# Patient Record
Sex: MALE | NOT HISPANIC OR LATINO | URBAN - METROPOLITAN AREA
[De-identification: names, ages, dates, MRNs, and addresses within clinical notes are randomized per-mention and may not be internally consistent; named-entity substitution may affect disease eponyms.]

---

## 2023-10-07 ENCOUNTER — APPOINTMENT (OUTPATIENT)
Dept: RADIOLOGY | Facility: HOSPITAL | Age: 63
End: 2023-10-07
Payer: COMMERCIAL

## 2023-10-07 ENCOUNTER — HOSPITAL ENCOUNTER (EMERGENCY)
Facility: HOSPITAL | Age: 63
Discharge: HOME | End: 2023-10-07
Attending: EMERGENCY MEDICINE
Payer: COMMERCIAL

## 2023-10-07 VITALS
SYSTOLIC BLOOD PRESSURE: 146 MMHG | OXYGEN SATURATION: 96 % | DIASTOLIC BLOOD PRESSURE: 94 MMHG | HEIGHT: 71 IN | TEMPERATURE: 97.9 F | RESPIRATION RATE: 22 BRPM | WEIGHT: 209.44 LBS | BODY MASS INDEX: 29.32 KG/M2 | HEART RATE: 80 BPM

## 2023-10-07 DIAGNOSIS — T50.901A ACCIDENTAL DRUG OVERDOSE, INITIAL ENCOUNTER: Primary | ICD-10-CM

## 2023-10-07 PROCEDURE — 71045 X-RAY EXAM CHEST 1 VIEW: CPT | Mod: FY

## 2023-10-07 PROCEDURE — 99285 EMERGENCY DEPT VISIT HI MDM: CPT | Performed by: EMERGENCY MEDICINE

## 2023-10-07 PROCEDURE — 99284 EMERGENCY DEPT VISIT MOD MDM: CPT | Performed by: EMERGENCY MEDICINE

## 2023-10-07 PROCEDURE — 99283 EMERGENCY DEPT VISIT LOW MDM: CPT | Mod: 25

## 2023-10-07 PROCEDURE — 71045 X-RAY EXAM CHEST 1 VIEW: CPT | Mod: FOREIGN READ | Performed by: RADIOLOGY

## 2023-10-07 ASSESSMENT — COLUMBIA-SUICIDE SEVERITY RATING SCALE - C-SSRS
2. HAVE YOU ACTUALLY HAD ANY THOUGHTS OF KILLING YOURSELF?: NO
6. HAVE YOU EVER DONE ANYTHING, STARTED TO DO ANYTHING, OR PREPARED TO DO ANYTHING TO END YOUR LIFE?: NO
1. IN THE PAST MONTH, HAVE YOU WISHED YOU WERE DEAD OR WISHED YOU COULD GO TO SLEEP AND NOT WAKE UP?: NO

## 2023-10-07 ASSESSMENT — PAIN - FUNCTIONAL ASSESSMENT
PAIN_FUNCTIONAL_ASSESSMENT: 0-10
PAIN_FUNCTIONAL_ASSESSMENT: 0-10

## 2023-10-07 ASSESSMENT — LIFESTYLE VARIABLES
HAVE PEOPLE ANNOYED YOU BY CRITICIZING YOUR DRINKING: NO
EVER FELT BAD OR GUILTY ABOUT YOUR DRINKING: NO
EVER HAD A DRINK FIRST THING IN THE MORNING TO STEADY YOUR NERVES TO GET RID OF A HANGOVER: NO
HAVE YOU EVER FELT YOU SHOULD CUT DOWN ON YOUR DRINKING: NO

## 2023-10-07 ASSESSMENT — PAIN SCALES - GENERAL
PAINLEVEL_OUTOF10: 0 - NO PAIN
PAINLEVEL_OUTOF10: 0 - NO PAIN

## 2023-10-07 NOTE — ED PROVIDER NOTES
HPI   Chief Complaint   Patient presents with    Drug Overdose     16mg narcan given by ems       HPI  Patient is a 63-year-old male presents the emergency department today due to concern for overdose.  Patient recently was released from shelter and was at a friend's house when he started what he thought was cocaine however was reportedly laced with fentanyl.  His friend gave him 4 mg of Narcan in place ended up giving patient an additional 12 mg of Narcan.  He is alert and oriented on arrival.  He denies any chest pain, abdominal pain, nausea.  He did throughout for EMS after receiving Narcan.                  Radha Coma Scale Score: 15                  Patient History   No past medical history on file.  No past surgical history on file.  No family history on file.  Social History     Tobacco Use    Smoking status: Not on file    Smokeless tobacco: Not on file   Substance Use Topics    Alcohol use: Not on file    Drug use: Not on file       Physical Exam   ED Triage Vitals [10/07/23 0215]   Temp Heart Rate Resp BP   36.6 °C (97.9 °F) 76 26 135/83      SpO2 Temp Source Heart Rate Source Patient Position   94 % Tympanic Monitor --      BP Location FiO2 (%)     -- --       Physical Exam  Vitals and nursing note reviewed.   Constitutional:       General: He is not in acute distress.     Appearance: He is well-developed.   HENT:      Head: Normocephalic and atraumatic.   Eyes:      Conjunctiva/sclera: Conjunctivae normal.   Cardiovascular:      Rate and Rhythm: Normal rate and regular rhythm.      Heart sounds: No murmur heard.  Pulmonary:      Effort: Pulmonary effort is normal. No respiratory distress.      Breath sounds: Normal breath sounds.   Abdominal:      Palpations: Abdomen is soft.      Tenderness: There is no abdominal tenderness.   Musculoskeletal:         General: No swelling.      Cervical back: Neck supple.   Skin:     General: Skin is warm and dry.      Capillary Refill: Capillary refill takes less than 2  seconds.   Neurological:      Mental Status: He is alert.   Psychiatric:         Mood and Affect: Mood normal.         ED Course & MDM   ED Course as of 10/09/23 0143   Sat Oct 07, 2023   0423 Patient is a 63-year-old male who presents the ER due to concern for  narcotic overdose.  On arrival, no acute distress.  Vital signs are stable.  Did get x-ray imaging as he did throw up after receiving Narcan since for possible aspiration.  X-ray imaging did not reveal any acute cardiopulmonary process but did show borderline size cardiac silhouette.  No signs of pulmonary edema on exam.   [MJ]   0738 Patient was observed in ER for greater than 4 hours with no change in status.  Given that patient has remained stable with no signs of airway compromise, feel patient is appropriate for discharge and outpatient management.  Patient was comfortable with this plan.  Strict return precautions given.  He is instructed to follow-up with his PCP.  Patient was understanding and agreeable with plan for discharge. [MJ]      ED Course User Index  [MJ] Omar Logan DO         Diagnoses as of 10/09/23 0143   Accidental drug overdose, initial encounter       Medical Decision Making      Procedure  Procedures     Omar Logan DO  Resident  10/07/23 0739    The patient was seen by the resident/fellow.  I have personally performed a substantive portion of the encounter.  I have seen and examined the patient; agree with the workup, evaluation, MDM, management and diagnosis.  The care plan has been discussed with the resident; I have reviewed the resident’s note and agree with the documented findings.         Michael Ruelas DO  10/09/23 0144

## 2023-10-09 ENCOUNTER — HOSPITAL ENCOUNTER (OUTPATIENT)
Dept: CARDIOLOGY | Facility: HOSPITAL | Age: 63
Discharge: HOME | End: 2023-10-09
Payer: COMMERCIAL

## 2023-10-09 LAB
ATRIAL RATE: 76 BPM
P AXIS: 82 DEGREES
P OFFSET: 176 MS
P ONSET: 128 MS
PR INTERVAL: 158 MS
Q ONSET: 207 MS
QRS COUNT: 13 BEATS
QRS DURATION: 114 MS
QT INTERVAL: 402 MS
QTC CALCULATION(BAZETT): 452 MS
QTC FREDERICIA: 434 MS
R AXIS: -62 DEGREES
T AXIS: 33 DEGREES
T OFFSET: 408 MS
VENTRICULAR RATE: 76 BPM

## 2023-10-09 PROCEDURE — 93005 ELECTROCARDIOGRAM TRACING: CPT

## 2023-10-15 ENCOUNTER — HOSPITAL ENCOUNTER (EMERGENCY)
Facility: HOSPITAL | Age: 63
Discharge: LEFT BEFORE DISCHARGE | End: 2023-10-16
Attending: STUDENT IN AN ORGANIZED HEALTH CARE EDUCATION/TRAINING PROGRAM
Payer: COMMERCIAL

## 2023-10-15 VITALS
BODY MASS INDEX: 28 KG/M2 | SYSTOLIC BLOOD PRESSURE: 109 MMHG | TEMPERATURE: 99.1 F | HEIGHT: 71 IN | RESPIRATION RATE: 18 BRPM | HEART RATE: 104 BPM | OXYGEN SATURATION: 99 % | WEIGHT: 199.98 LBS | DIASTOLIC BLOOD PRESSURE: 76 MMHG

## 2023-10-15 DIAGNOSIS — T50.901A ACCIDENTAL DRUG OVERDOSE, INITIAL ENCOUNTER: Primary | ICD-10-CM

## 2023-10-15 PROCEDURE — 99283 EMERGENCY DEPT VISIT LOW MDM: CPT | Performed by: STUDENT IN AN ORGANIZED HEALTH CARE EDUCATION/TRAINING PROGRAM

## 2023-10-15 PROCEDURE — 99284 EMERGENCY DEPT VISIT MOD MDM: CPT | Performed by: STUDENT IN AN ORGANIZED HEALTH CARE EDUCATION/TRAINING PROGRAM

## 2023-10-15 PROCEDURE — 99283 EMERGENCY DEPT VISIT LOW MDM: CPT

## 2023-10-15 PROCEDURE — 99281 EMR DPT VST MAYX REQ PHY/QHP: CPT

## 2023-10-15 ASSESSMENT — COLUMBIA-SUICIDE SEVERITY RATING SCALE - C-SSRS
1. IN THE PAST MONTH, HAVE YOU WISHED YOU WERE DEAD OR WISHED YOU COULD GO TO SLEEP AND NOT WAKE UP?: NO
6. HAVE YOU EVER DONE ANYTHING, STARTED TO DO ANYTHING, OR PREPARED TO DO ANYTHING TO END YOUR LIFE?: NO
2. HAVE YOU ACTUALLY HAD ANY THOUGHTS OF KILLING YOURSELF?: NO

## 2023-10-16 NOTE — ED PROVIDER NOTES
HPI   Chief Complaint   Patient presents with    Drug Overdose     Pt snorted unknown substance was found unresponsive in his home. PD/EMS gave a total of 16mg Narcan. Pt A&Ox4 upon arrival to ER with steady gait. States he wishes to leave.        63-year-old male presenting to the ED via EMS and police for drug overdose.  Patient reports that he snorted a small amount of cocaine today and did not use any known opiates or heroin.  Unsure if this was laced.  Family called police and they found him to be slumped over in his home.  Police gave 4 mg intranasal Narcan and when EMS arrived they gave an additional 3 doses for a total of 16 mg Narcan.  Patient did respond and they brought him to the ED for evaluation.  Patient denies any current IV drug use.  He does endorse 1 alcoholic beverage today.  He denies chest pain, shortness of breath, nausea, vomiting, diarrhea or recent illnesses.  He does not have any acute complaints at this time and is requesting to be discharged.      History provided by:  Patient and EMS personnel                      No data recorded                Patient History   No past medical history on file.  No past surgical history on file.  No family history on file.  Social History     Tobacco Use    Smoking status: Not on file    Smokeless tobacco: Not on file   Substance Use Topics    Alcohol use: Not on file    Drug use: Not on file       Physical Exam   ED Triage Vitals [10/15/23 2338]   Temp Heart Rate Resp BP   37.3 °C (99.1 °F) 104 18 109/76      SpO2 Temp Source Heart Rate Source Patient Position   99 % Temporal -- Sitting      BP Location FiO2 (%)     Right arm --       Physical Exam  Vitals and nursing note reviewed.   Constitutional:       General: He is not in acute distress.     Appearance: He is not toxic-appearing.   HENT:      Head: Normocephalic and atraumatic.      Nose: Nose normal.      Mouth/Throat:      Mouth: Mucous membranes are moist.   Eyes:      Extraocular Movements:  Extraocular movements intact.      Conjunctiva/sclera: Conjunctivae normal.      Pupils: Pupils are equal, round, and reactive to light.   Cardiovascular:      Rate and Rhythm: Regular rhythm. Tachycardia present.      Pulses: Normal pulses.      Heart sounds: Normal heart sounds.   Pulmonary:      Effort: Pulmonary effort is normal.      Breath sounds: Normal breath sounds.   Abdominal:      General: There is no distension.      Palpations: Abdomen is soft.      Tenderness: There is no abdominal tenderness.   Musculoskeletal:         General: Normal range of motion.      Cervical back: Normal range of motion and neck supple.   Skin:     General: Skin is warm and dry.      Capillary Refill: Capillary refill takes less than 2 seconds.   Neurological:      General: No focal deficit present.      Mental Status: He is alert and oriented to person, place, and time. Mental status is at baseline.         ED Course & MDM   Diagnoses as of 10/15/23 2343   Accidental drug overdose, initial encounter       Medical Decision Making  63-year-old male presenting to the ED via police and EMS for unintentional drug overdose that responded to 16 mg of Narcan.  Patient is alert, oriented x4 and neurologically intact in the ED.  He does not have any acute complaints.  He is hemodynamically stable.  His lung sounds are clear.  Patient reports this overdose was unintentional and he had only snorted cocaine today.    Patient is requesting to be discharged.  Advised that he will be signed out AGAINST MEDICAL ADVICE due to the high amount of Narcan he received today and he is at high risk of recurrent respiratory depression, pulmonary edema, cardiac arrest and death.  Patient exhibits capacity in making medical decisions and does not appear to have an altered level of consciousness.  He understands the risks of leaving at this time without further monitoring.  He is agreeable to return to the ED for any worsening symptoms.  He was given  referral for primary care physician to set up care.  States he is currently also on naltrexone and has a month supply after being discharged from [facility] recently.  He will establish care for further management and will return to the ED for any worsening symptoms.          Procedure  Procedures     Anson Zazueta DO  Resident  10/15/23 0682

## 2023-11-11 NOTE — DISCHARGE INSTRUCTIONS
Please return to the ER at any time if you have symptoms and call to schedule an appointment with a primary care physician to establish care.  
Discharged